# Patient Record
Sex: FEMALE | Race: WHITE | NOT HISPANIC OR LATINO | URBAN - METROPOLITAN AREA
[De-identification: names, ages, dates, MRNs, and addresses within clinical notes are randomized per-mention and may not be internally consistent; named-entity substitution may affect disease eponyms.]

---

## 2023-01-28 ENCOUNTER — APPOINTMENT (EMERGENCY)
Dept: RADIOLOGY | Facility: HOSPITAL | Age: 68
End: 2023-01-28

## 2023-01-28 ENCOUNTER — HOSPITAL ENCOUNTER (EMERGENCY)
Facility: HOSPITAL | Age: 68
Discharge: HOME | End: 2023-01-28
Attending: EMERGENCY MEDICINE

## 2023-01-28 VITALS
RESPIRATION RATE: 18 BRPM | BODY MASS INDEX: 35.11 KG/M2 | WEIGHT: 218.48 LBS | SYSTOLIC BLOOD PRESSURE: 120 MMHG | TEMPERATURE: 97.8 F | DIASTOLIC BLOOD PRESSURE: 55 MMHG | HEIGHT: 66 IN | HEART RATE: 58 BPM | OXYGEN SATURATION: 100 %

## 2023-01-28 DIAGNOSIS — S52.501A CLOSED FRACTURE OF RIGHT DISTAL RADIUS: Primary | ICD-10-CM

## 2023-01-28 RX ORDER — OXYCODONE HYDROCHLORIDE 5 MG/1
5 TABLET ORAL ONCE
Status: COMPLETED | OUTPATIENT
Start: 2023-01-28 | End: 2023-01-28

## 2023-01-28 RX ORDER — OXYCODONE HYDROCHLORIDE 5 MG/1
5 TABLET ORAL EVERY 6 HOURS PRN
Qty: 12 TABLET | Refills: 0 | Status: SHIPPED | OUTPATIENT
Start: 2023-01-28 | End: 2023-02-01

## 2023-01-28 RX ORDER — ATORVASTATIN CALCIUM 40 MG/1
40 TABLET, FILM COATED ORAL DAILY
COMMUNITY

## 2023-01-28 RX ORDER — PROPOFOL 10 MG/ML
100 INJECTION, EMULSION INTRAVENOUS ONCE
Status: COMPLETED | OUTPATIENT
Start: 2023-01-28 | End: 2023-01-28

## 2023-01-28 RX ORDER — ACETAMINOPHEN 325 MG/1
975 TABLET ORAL ONCE
Status: COMPLETED | OUTPATIENT
Start: 2023-01-28 | End: 2023-01-28

## 2023-01-28 RX ORDER — MELATONIN
1000 DAILY
COMMUNITY

## 2023-01-28 RX ORDER — KETOROLAC TROMETHAMINE 30 MG/ML
30 INJECTION, SOLUTION INTRAMUSCULAR; INTRAVENOUS ONCE
Status: COMPLETED | OUTPATIENT
Start: 2023-01-28 | End: 2023-01-28

## 2023-01-28 RX ORDER — DULOXETIN HYDROCHLORIDE 60 MG/1
120 CAPSULE, DELAYED RELEASE ORAL DAILY
COMMUNITY

## 2023-01-28 RX ADMIN — PROPOFOL 100 MG: 10 INJECTION, EMULSION INTRAVENOUS at 20:07

## 2023-01-28 RX ADMIN — OXYCODONE HYDROCHLORIDE 5 MG: 5 TABLET ORAL at 18:31

## 2023-01-28 RX ADMIN — KETOROLAC TROMETHAMINE 30 MG: 30 INJECTION, SOLUTION INTRAMUSCULAR; INTRAVENOUS at 18:33

## 2023-01-28 RX ADMIN — SODIUM CHLORIDE 1000 ML: 0.9 INJECTION, SOLUTION INTRAVENOUS at 19:32

## 2023-01-28 RX ADMIN — ACETAMINOPHEN 975 MG: 325 TABLET ORAL at 18:31

## 2023-01-28 NOTE — ED NOTES
Unable to obtain bp x3 due to the way patient is holding wrist and unable to tolerate change in positions  Will get bp in the back        Mira Linares RN  01/28/23 9036

## 2023-01-28 NOTE — ED PROVIDER NOTES
Final Diagnosis:  1  Closed fracture of right distal radius        Chief Complaint   Patient presents with   • Wrist Injury     Pt reports she just [place] the bowling [place] and has deformity to right wrist  Denies head strike, denies thinners  Gcs 15  Only complaint is right wrist       HPI  Patient presents for evaluation of right wrist pain  Sounds like the patient was at a bowling alley and then fell backwards, reaching back with her right hand to break her fall  Immediate onset of pain  She cannot tell me where the pain is worse which is generalized around her distal right forearm  It is worse with movement and better at rest   She has taken nothing for it  Denies any pain in the elbow  No discomfort in hand  Unless otherwise specified:  - No language barrier    - History obtained from patient  - There are no limitations to the history obtained  - Previous charting was reviewed    PMH:   has no past medical history on file  PSH:   has no past surgical history on file  ROS:  Review of Systems   -   - 13 point ROS was performed and all are normal unless stated in the history above  - Nursing note reviewed  Vitals reviewed  - Orders placed by myself and/or advanced practitioner / resident  PE:   Vitals:    01/28/23 2019 01/28/23 2021 01/28/23 2024 01/28/23 2030   BP: 116/56  118/57 118/57   BP Location: Right arm  Left arm Left arm   Pulse: 57 56 (!) 54 55   Resp:   18 18   Temp:       TempSrc: Oral      SpO2: 100% 100% 100% 99%   Weight:       Height:         Vitals reviewed by me  Patient appears uncomfortable sitting in bed  Right wrist is wrapped and ice  I removed the current dressing  There is an obvious deformity  At least mild angulation  High concern for distal radius fracture, possibly ulna  Good capillary refill  Patient is able to move her digits  No pain at the elbow      Unless otherwise specified above:    General: VS reviewed  Appears in NAD    Head: Normocephalic, atraumatic  Eyes: EOM-I  No exudate  ENT: Atraumatic external nose and ears  No malocclusion  No stridor  No drooling  Neck: No JVD  CV: No pallor noted  Lungs:   No tachypnea  No respiratory distress    Abdomen:  Soft, non-tender, non-distended    MSK:   No obvious deformity    Skin: Dry, intact  No obvious rash  Neuro: Awake, alert, GCS15, CN II-XII grossly intact  Speaking in full sentences  Motor grossly intact  Psychiatric/Behavioral: Appropriate mood and affect   Exam: deferred    Physical Exam     Pre-Procedural Sedation  Performed by: Cain Pulido MD  Authorized by: Cain Pulido MD     Consent:     Consent obtained:  Written    Consent given by:  Patient    Risks discussed:   Allergic reaction, dysrhythmia, inadequate sedation, nausea, respiratory compromise necessitating ventilatory assistance and intubation and prolonged sedation necessitating reversal  Universal protocol:     Patient identity confirmation method:  Arm band and verbally with patient  Indications:     Sedation purpose:  Fracture reduction    Procedure necessitating sedation performed by:  Physician performing sedation    Intended level of sedation:  Moderate (conscious sedation)  Pre-sedation assessment:     ASA classification: class 2 - patient with mild systemic disease      Mouth openin finger widths    Thyromental distance:  3 finger widths    Mallampati score:  II - soft palate, uvula, fauces visible    Pre-sedation assessments completed and reviewed: airway patency, cardiovascular function, hydration status, mental status, nausea/vomiting, pain level, respiratory function and temperature      History of difficult intubation: no    Procedural Sedation    Date/Time: 2023 8:21 PM  Performed by: Cain Pulido MD  Authorized by: Cain Pulido MD     Immediate pre-procedure details:     Reviewed: vital signs and relevant labs/tests      Verified: bag valve mask available, emergency equipment available, intubation equipment available, IV patency confirmed, oxygen available and suction available    Procedure details (see MAR for exact dosages):     Preoxygenation:  Nasal cannula    Sedation:  Propofol    Intra-procedure monitoring:  Blood pressure monitoring, cardiac monitor, continuous capnometry, continuous pulse oximetry, frequent LOC assessments and frequent vital sign checks    Intra-procedure events: none      Total sedation time (minutes):  10  Post-procedure details:     Post-sedation assessment completed:  1/28/2023 8:22 PM    Attendance: Constant attendance by certified staff until patient recovered      Recovery: Patient returned to pre-procedure baseline      Post-sedation assessments completed and reviewed: airway patency, cardiovascular function, hydration status, mental status, nausea/vomiting, pain level, respiratory function and temperature      Patient tolerance: Tolerated well, no immediate complications  Orthopedic injury treatment    Date/Time: 1/28/2023 8:22 PM  Performed by: Venkat Maravilla MD  Authorized by: Venkat Maravilla MD     Patient Location:  ED  [place] Protocol:  Consent: Written consent obtained  Consent given by: patient  Patient identity confirmed: verbally with patient      Injury location:  Forearm  Location details:  Right forearm  Injury type:  Fracture  Fracture type: distal radius    Fracture type: distal radius    Neurovascular status: Neurovascularly intact    Distal perfusion: normal    Neurological function: normal    Range of motion: reduced    Local anesthesia used?: No    General anesthesia used?: No    Sedation type:   Moderate (conscious) sedation (See separate Procedural Sedation form)  Manipulation performed?: Yes    Skeletal traction used?: Yes    Reduction successful?: Yes    Confirmation: Reduction confirmed by x-ray    Immobilization:  Splint, sling and ace wrap  Splint type:  Volar short arm  Supplies used:  Cotton padding, elastic bandage and Ortho-Glass  Neurovascular status: Neurovascularly intact    Distal perfusion: normal    Neurological function: normal    Range of motion: unchanged    Patient tolerance:  Patient tolerated the procedure well with no immediate complications       A:  - Nursing note reviewed  ED Course as of 01/28/23 2037   Sat Jan 28, 2023   1907 XR wrist 3+ views RIGHT  Distal radius fracture   2019 Post reduction films show improved alignment  XR wrist 3+ views RIGHT   ED Interpretation   Improved angulation and alignment      XR wrist 3+ views RIGHT   ED Interpretation   Distal radius fracture        Orders Placed This Encounter   Procedures   • Pre-Procedural Sedation   • Procedural Sedation   • Orthopedic injury treatment   • XR wrist 3+ views RIGHT   • XR wrist 3+ views RIGHT   • Ambulatory Referral to Orthopedic Surgery   • Insert peripheral IV   • Apply sling     Labs Reviewed - No data to display      Final Diagnosis:  1  Closed fracture of right distal radius        P:  -Patient presents for evaluation of pain after fall  Will provide x-rays, analgesia, anticipate splinting, possible reduction  -X-ray consistent with distal radius fracture  Patient was then consented and reduction was performed  Significant improvement of angulation  Discussed return precautions, provide pain control and analgesia for use at home        Medications   acetaminophen (TYLENOL) tablet 975 mg (975 mg Oral Given 1/28/23 1831)   ketorolac (TORADOL) injection 30 mg (30 mg Intramuscular Given 1/28/23 1833)   oxyCODONE (ROXICODONE) IR tablet 5 mg (5 mg Oral Given 1/28/23 1831)   sodium chloride 0 9 % bolus 1,000 mL (1,000 mL Intravenous New Bag 1/28/23 1932)   propofol (DIPRIVAN) 200 MG/20ML bolus injection 100 mg (100 mg Intravenous Given 1/28/23 2007)     Time reflects when diagnosis was documented in both MDM as applicable and the Disposition within this note     Time User Action Codes Description Comment    1/28/2023  8:24 PM Leonor Fuchs Add [S52 501A] Closed fracture of right distal radius       ED Disposition     ED Disposition   Discharge    Condition   Stable    Date/Time   Sat Jan 28, 2023  8:23 PM    Comment   Tiny Chimes discharge to home/self care  Follow-up Information     Follow up With Specialties Details Why Contact Info Additional [phone] [facility] Orthopedic Surgery Schedule an appointment as soon as possible for a visit   [address] [facility]  [address] [facility] [id number]  [address] [place] [address], [address] [place], [place], [id number] [phone]        Patient's Medications   Discharge Prescriptions    OXYCODONE (ROXICODONE) 5 IMMEDIATE RELEASE TABLET    Take 1 tablet (5 mg total) by mouth every 6 (six) hours as needed for severe pain for up to 3 days Max Daily Amount: 20 mg       Start Date: 1/28/2023 End Date: 1/31/2023       Order Dose: 5 mg       Quantity: 12 tablet    Refills: 0       Prior to Admission Medications   Prescriptions Last Dose Informant Patient Reported? Taking? DULoxetine (CYMBALTA) 60 mg delayed release capsule   Yes Yes   Sig: Take 120 mg by mouth daily   atorvastatin (LIPITOR) 40 mg tablet   Yes Yes   Sig: Take 40 mg by mouth daily   cholecalciferol (VITAMIN D3) 1,000 units tablet   Yes Yes   Sig: Take 1,000 Units by mouth daily      Facility-Administered Medications: None       Portions of the record may have been created with voice recognition software  Occasional wrong word or "sound a like" substitutions may have occurred due to the inherent limitations of voice recognition software  Read the chart carefully and recognize, using context, where substitutions have occurred      Electronically signed by:  MD John Paul Guillen MD  01/28/23 2037

## 2023-01-29 NOTE — ED NOTES
Ambu bag, suctioning, capnography and all other items prepared and readily available for conscious sedation        Maria L Jalloh RN  01/28/23 1958

## 2023-01-30 ENCOUNTER — TELEPHONE (OUTPATIENT)
Dept: OBGYN CLINIC | Facility: HOSPITAL | Age: 68
End: 2023-01-30

## 2023-01-30 NOTE — TELEPHONE ENCOUNTER
Called and spoke w/pt and she was able to loosen and area of ace wrap near thumb that was too tight  She is elevating arm and following instructions and feels better  Will be seen at appt tomorrow

## 2023-01-30 NOTE — TELEPHONE ENCOUNTER
Pt called and states had fall [place] [employer] [place] 1[place]23 and went to ED  Dx Closed R Fx of distal radius  Is currently calling due to swelling  Has been keeping elevated and icing  Pt states ace wrap is on and can get two finges at top and bottom of ace  Nailbeds pink  Pain level 6  Pt has not taken anything for pain  She took on oxycodone at noon  Took tylenol at night  Tylenol per label more recommended dose; Advil/Mortrin/Alever per label instructions  Advised pt to continue ace secure but not too tight, elevate, ice and take pain med as prescribed  Please advise any further recommendations  Appt is tomorrow

## 2023-01-31 ENCOUNTER — OFFICE VISIT (OUTPATIENT)
Dept: OBGYN CLINIC | Facility: HOSPITAL | Age: 68
End: 2023-01-31

## 2023-01-31 VITALS
SYSTOLIC BLOOD PRESSURE: 135 MMHG | HEART RATE: 70 BPM | WEIGHT: 218.48 LBS | BODY MASS INDEX: 35.11 KG/M2 | HEIGHT: 66 IN | DIASTOLIC BLOOD PRESSURE: 89 MMHG

## 2023-01-31 DIAGNOSIS — S52.501A CLOSED FRACTURE OF RIGHT DISTAL RADIUS: ICD-10-CM

## 2023-01-31 RX ORDER — LOSARTAN POTASSIUM 100 MG/1
100 TABLET ORAL EVERY MORNING
COMMUNITY
Start: 2022-11-14

## 2023-01-31 RX ORDER — CHLORHEXIDINE GLUCONATE 0.12 MG/ML
15 RINSE ORAL ONCE
Status: CANCELLED | OUTPATIENT
Start: 2023-01-31 | End: 2023-01-31

## 2023-01-31 RX ORDER — ATORVASTATIN CALCIUM 40 MG/1
40 TABLET, FILM COATED ORAL DAILY
COMMUNITY
Start: 2022-12-28

## 2023-01-31 RX ORDER — CEFAZOLIN SODIUM 2 G/50ML
2000 SOLUTION INTRAVENOUS ONCE
Status: CANCELLED | OUTPATIENT
Start: 2023-01-31 | End: 2023-01-31

## 2023-01-31 RX ORDER — SODIUM CHLORIDE, SODIUM LACTATE, POTASSIUM CHLORIDE, CALCIUM CHLORIDE 600; 310; 30; 20 MG/100ML; MG/100ML; MG/100ML; MG/100ML
125 INJECTION, SOLUTION INTRAVENOUS CONTINUOUS
Status: CANCELLED | OUTPATIENT
Start: 2023-01-31

## 2023-01-31 NOTE — PROGRESS NOTES
H&P Exam - Orthopedics   Jared Cho 79 y o  female MRN: [id number]  Unit/Bed#:  Encounter: [id number]    Assessment/Plan     Assessment:  Right distal radius fracture  Plan:  • Because the patient's fracture was significantly dorsally displaced prior to reduction, we believe that she would benefit from operative fixation of the right wrist fracture  • The process for surgery was discussed with the patient  We discussed that an incision will be made over the volar aspect of her wrist, necessary structures will be moved aside, the fracture will be reduced, and a plate will be placed on the radius and secured with screws  The surgery typically takes less than 1 hour  After this, she will be immobilized for approximately 2 weeks in a brace, and then we will allow her to work on range of motion  Risks for this and any surgery include bleeding, blood clot, and infection, but that all of these are very unlikely to occur with this surgery  Complications specific to this surgery include hardware failure and the need for repeat surgery  Benefits include an eventual full return to function  • All questions were answered to the patient's satisfaction  Surgical consent was signed at today's appointment and the surgery was scheduled  History of Present Illness   HPI:  Jared Cho is a 79 y o  female who presents to the office today for evaluation of her right distal radius fracture  She was bowling 3 days ago on 1/28 when she "twirled" and fell onto her rear end and put her hand back to catch herself  She had immediate wrist pain and presented to the ER where the fracture was reduction and splinted  She is here today to follow up  She denies numbness or tingling in the hand and fingers  She does not work, but 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  Review of Systems   Constitutional: Negative for chills and fever  HENT: Negative for ear pain and sore throat  Eyes: Negative for pain and visual disturbance  Respiratory: Negative for cough and shortness of breath  Cardiovascular: Negative for chest pain and palpitations  Gastrointestinal: Negative for abdominal pain and vomiting  Musculoskeletal: Negative for arthralgias and back pain  Skin: Negative for color change and rash  Neurological: Negative for seizures and syncope  All other systems reviewed and are negative  Historical Information   History reviewed  No pertinent past medical history  History reviewed  No pertinent surgical history  Social History   Social History     Substance and Sexual Activity   Alcohol Use Not Currently     Social History     Substance and Sexual Activity   Drug Use Not Currently     Social History     Tobacco Use   Smoking Status Never   Smokeless Tobacco Never     Family History: History reviewed  No pertinent family history  Meds/Allergies     Current Outpatient Medications:   •  atorvastatin (LIPITOR) 40 mg tablet, Take 40 mg by mouth daily, Disp: , Rfl:   •  atorvastatin (LIPITOR) 40 mg tablet, Take 40 mg by mouth daily, Disp: , Rfl:   •  cholecalciferol (VITAMIN D3) 1,000 units tablet, Take 1,000 Units by mouth daily, Disp: , Rfl:   •  DULoxetine (CYMBALTA) 60 mg delayed release capsule, Take 120 mg by mouth daily, Disp: , Rfl:   •  losartan (COZAAR) 100 MG tablet, Take 100 mg by mouth every morning, Disp: , Rfl:   •  oxyCODONE (Roxicodone) 5 immediate release tablet, Take 1 tablet (5 mg total) by mouth every 6 (six) hours as needed for severe pain for up to 3 days Max Daily Amount: 20 mg, Disp: 12 tablet, Rfl: 0    No Known Allergies    Objective   Vitals: Blood pressure 135/89, pulse 70, height 5' 6" (1 676 m), weight 99 1 kg (218 lb 7 6 oz)  ,Body mass index is 35 26 kg/m²  Invasive Devices     None                 Physical Exam  Vitals and nursing note reviewed  Constitutional:       General: She is not in acute distress       Appearance: She is well-developed  HENT:      Head: Normocephalic and atraumatic  Eyes:      Conjunctiva/sclera: Conjunctivae normal    Cardiovascular:      Heart sounds: No murmur heard  Comments: No discernible arrhthymias  Pulmonary:      Effort: Pulmonary effort is normal  No respiratory distress  Breath sounds: Normal breath sounds  Abdominal:      Palpations: Abdomen is soft  Tenderness: There is no abdominal tenderness  Musculoskeletal:         General: No swelling  Cervical back: Neck supple  Skin:     General: Skin is warm and dry  Capillary Refill: Capillary refill takes less than 2 seconds  Neurological:      Mental Status: She is alert  Psychiatric:         Mood and Affect: Mood normal        Ortho Exam     Right Wrist:     Inspection: The fingers are uniformly edematous without ecchymosis or visible wounds      Range of Motion:  She is able to move all of her fingers without difficulty      Sensation:  She has good sensation over all fingers      Other:  Fingers WWP  Lab Results: I have personally reviewed pertinent lab results  Imaging: I have personally reviewed pertinent films in PACS  Right wrist X-rays reveal a distal radius fracture which was dorsally displaced prior to reduction  Alignment is improved on follow-up imaging after reduction  EKG, Pathology, and Other Studies: I have personally reviewed pertinent reports  Code Status: Level 1, Full Code  Advance Directive and Living Will:  Not on file  Power of :  Not on file  POLST:  Not on file    Counseling / Coordination of Care  Total floor / unit time spent today 20 minutes  Greater than 50% of total time was spent with the patient and / or family counseling and / or coordination of care  A description of the counseling / coordination of care: discussing, signing consent for, and scheduling upcoming surgery          Scribe Attestation    I,:  Carlos Oglesby PA-C am acting as a scribe while in the presence of the attending physician :       I,:  Yael Duneas MD personally performed the services described in this documentation    as scribed in my presence :

## 2023-02-01 ENCOUNTER — ANESTHESIA (OUTPATIENT)
Dept: PERIOP | Facility: HOSPITAL | Age: 68
End: 2023-02-01

## 2023-02-01 ENCOUNTER — HOSPITAL ENCOUNTER (OUTPATIENT)
Dept: RADIOLOGY | Facility: HOSPITAL | Age: 68
Setting detail: OUTPATIENT SURGERY
Discharge: HOME | End: 2023-02-01

## 2023-02-01 ENCOUNTER — HOSPITAL ENCOUNTER (OUTPATIENT)
Facility: HOSPITAL | Age: 68
Setting detail: OUTPATIENT SURGERY
Discharge: HOME | End: 2023-02-01
Attending: ORTHOPAEDIC SURGERY | Admitting: ORTHOPAEDIC SURGERY

## 2023-02-01 ENCOUNTER — ANESTHESIA EVENT (OUTPATIENT)
Dept: PERIOP | Facility: HOSPITAL | Age: 68
End: 2023-02-01

## 2023-02-01 VITALS
TEMPERATURE: 98.1 F | DIASTOLIC BLOOD PRESSURE: 83 MMHG | OXYGEN SATURATION: 96 % | BODY MASS INDEX: 35.03 KG/M2 | WEIGHT: 218 LBS | HEIGHT: 66 IN | RESPIRATION RATE: 18 BRPM | HEART RATE: 65 BPM | SYSTOLIC BLOOD PRESSURE: 156 MMHG

## 2023-02-01 DIAGNOSIS — S52.501A CLOSED FRACTURE OF RIGHT DISTAL RADIUS: ICD-10-CM

## 2023-02-01 DIAGNOSIS — M84.433A: Primary | ICD-10-CM

## 2023-02-01 DIAGNOSIS — S52.601A CLOSED FRACTURE OF RIGHT DISTAL RADIUS AND ULNA, INITIAL ENCOUNTER: ICD-10-CM

## 2023-02-01 DIAGNOSIS — S52.501A CLOSED FRACTURE OF RIGHT DISTAL RADIUS AND ULNA, INITIAL ENCOUNTER: ICD-10-CM

## 2023-02-01 DEVICE — 2.4MM VA LOCKING SCREW STARDRIVE 18MM: Type: IMPLANTABLE DEVICE | Site: WRIST | Status: FUNCTIONAL

## 2023-02-01 DEVICE — 2.4MM VA-LCP 2-CLMN VLR DSTL RADIUS PL 6H HD/3H SHAFT/RIGHT
Type: IMPLANTABLE DEVICE | Site: WRIST | Status: FUNCTIONAL
Brand: VA-LCP

## 2023-02-01 DEVICE — 2.4MM VA LOCKING SCREW STARDRIVE 16MM: Type: IMPLANTABLE DEVICE | Site: WRIST | Status: FUNCTIONAL

## 2023-02-01 DEVICE — 2.7MM CORTEX SCREW SLF-TPNG WITH T8 STARDRIVE RECESS 10MM: Type: IMPLANTABLE DEVICE | Site: WRIST | Status: FUNCTIONAL

## 2023-02-01 DEVICE — 2.7MM CORTEX SCREW SLF-TPNG WITH T8 STARDRIVE RECESS 12MM: Type: IMPLANTABLE DEVICE | Site: WRIST | Status: FUNCTIONAL

## 2023-02-01 RX ORDER — MAGNESIUM HYDROXIDE 1200 MG/15ML
LIQUID ORAL AS NEEDED
Status: DISCONTINUED | OUTPATIENT
Start: 2023-02-01 | End: 2023-02-01 | Stop reason: HOSPADM

## 2023-02-01 RX ORDER — PROPOFOL 10 MG/ML
INJECTION, EMULSION INTRAVENOUS AS NEEDED
Status: DISCONTINUED | OUTPATIENT
Start: 2023-02-01 | End: 2023-02-01

## 2023-02-01 RX ORDER — MIDAZOLAM HYDROCHLORIDE 2 MG/2ML
INJECTION, SOLUTION INTRAMUSCULAR; INTRAVENOUS AS NEEDED
Status: DISCONTINUED | OUTPATIENT
Start: 2023-02-01 | End: 2023-02-01

## 2023-02-01 RX ORDER — CEFAZOLIN SODIUM 2 G/50ML
SOLUTION INTRAVENOUS AS NEEDED
Status: DISCONTINUED | OUTPATIENT
Start: 2023-02-01 | End: 2023-02-01

## 2023-02-01 RX ORDER — BUPIVACAINE HYDROCHLORIDE 5 MG/ML
INJECTION, SOLUTION PERINEURAL
Status: COMPLETED | OUTPATIENT
Start: 2023-02-01 | End: 2023-02-01

## 2023-02-01 RX ORDER — FENTANYL CITRATE/PF 50 MCG/ML
25 SYRINGE (ML) INJECTION
Status: DISCONTINUED | OUTPATIENT
Start: 2023-02-01 | End: 2023-02-01 | Stop reason: HOSPADM

## 2023-02-01 RX ORDER — SODIUM CHLORIDE, SODIUM LACTATE, POTASSIUM CHLORIDE, CALCIUM CHLORIDE 600; 310; 30; 20 MG/100ML; MG/100ML; MG/100ML; MG/100ML
INJECTION, SOLUTION INTRAVENOUS CONTINUOUS PRN
Status: DISCONTINUED | OUTPATIENT
Start: 2023-02-01 | End: 2023-02-01

## 2023-02-01 RX ORDER — FENTANYL CITRATE 50 UG/ML
INJECTION, SOLUTION INTRAMUSCULAR; INTRAVENOUS AS NEEDED
Status: DISCONTINUED | OUTPATIENT
Start: 2023-02-01 | End: 2023-02-01

## 2023-02-01 RX ORDER — ONDANSETRON 2 MG/ML
INJECTION INTRAMUSCULAR; INTRAVENOUS AS NEEDED
Status: DISCONTINUED | OUTPATIENT
Start: 2023-02-01 | End: 2023-02-01

## 2023-02-01 RX ORDER — DEXAMETHASONE SODIUM PHOSPHATE 10 MG/ML
INJECTION, SOLUTION INTRAMUSCULAR; INTRAVENOUS AS NEEDED
Status: DISCONTINUED | OUTPATIENT
Start: 2023-02-01 | End: 2023-02-01

## 2023-02-01 RX ORDER — DIPHENHYDRAMINE HYDROCHLORIDE 50 MG/ML
12.5 INJECTION INTRAMUSCULAR; INTRAVENOUS ONCE AS NEEDED
Status: DISCONTINUED | OUTPATIENT
Start: 2023-02-01 | End: 2023-02-01 | Stop reason: HOSPADM

## 2023-02-01 RX ORDER — SODIUM CHLORIDE, SODIUM LACTATE, POTASSIUM CHLORIDE, CALCIUM CHLORIDE 600; 310; 30; 20 MG/100ML; MG/100ML; MG/100ML; MG/100ML
50 INJECTION, SOLUTION INTRAVENOUS CONTINUOUS
Status: DISCONTINUED | OUTPATIENT
Start: 2023-02-01 | End: 2023-02-01 | Stop reason: HOSPADM

## 2023-02-01 RX ORDER — SODIUM CHLORIDE, SODIUM LACTATE, POTASSIUM CHLORIDE, CALCIUM CHLORIDE 600; 310; 30; 20 MG/100ML; MG/100ML; MG/100ML; MG/100ML
125 INJECTION, SOLUTION INTRAVENOUS CONTINUOUS
Status: DISCONTINUED | OUTPATIENT
Start: 2023-02-01 | End: 2023-02-01 | Stop reason: HOSPADM

## 2023-02-01 RX ORDER — LIDOCAINE HYDROCHLORIDE 10 MG/ML
INJECTION, SOLUTION EPIDURAL; INFILTRATION; INTRACAUDAL; PERINEURAL AS NEEDED
Status: DISCONTINUED | OUTPATIENT
Start: 2023-02-01 | End: 2023-02-01

## 2023-02-01 RX ORDER — ONDANSETRON 2 MG/ML
4 INJECTION INTRAMUSCULAR; INTRAVENOUS ONCE AS NEEDED
Status: DISCONTINUED | OUTPATIENT
Start: 2023-02-01 | End: 2023-02-01 | Stop reason: HOSPADM

## 2023-02-01 RX ORDER — CEFAZOLIN SODIUM 2 G/50ML
2000 SOLUTION INTRAVENOUS ONCE
Status: DISCONTINUED | OUTPATIENT
Start: 2023-02-01 | End: 2023-02-01 | Stop reason: HOSPADM

## 2023-02-01 RX ORDER — OXYCODONE HYDROCHLORIDE 5 MG/1
5 TABLET ORAL EVERY 4 HOURS PRN
Qty: 20 TABLET | Refills: 0 | Status: SHIPPED | OUTPATIENT
Start: 2023-02-01 | End: 2023-02-11

## 2023-02-01 RX ADMIN — DEXAMETHASONE SODIUM PHOSPHATE 10 MG: 10 INJECTION, SOLUTION INTRAMUSCULAR; INTRAVENOUS at 13:09

## 2023-02-01 RX ADMIN — BUPIVACAINE HYDROCHLORIDE 7 ML: 5 INJECTION, SOLUTION PERINEURAL at 12:46

## 2023-02-01 RX ADMIN — ONDANSETRON 4 MG: 2 INJECTION INTRAMUSCULAR; INTRAVENOUS at 13:07

## 2023-02-01 RX ADMIN — LIDOCAINE HYDROCHLORIDE 20 MG: 10 INJECTION, SOLUTION EPIDURAL; INFILTRATION; INTRACAUDAL; PERINEURAL at 13:07

## 2023-02-01 RX ADMIN — SODIUM CHLORIDE, SODIUM LACTATE, POTASSIUM CHLORIDE, AND CALCIUM CHLORIDE 125 ML/HR: .6; .31; .03; .02 INJECTION, SOLUTION INTRAVENOUS at 12:24

## 2023-02-01 RX ADMIN — BUPIVACAINE 20 ML: 13.3 INJECTION, SUSPENSION, LIPOSOMAL INFILTRATION at 12:46

## 2023-02-01 RX ADMIN — CEFAZOLIN SODIUM 2000 MG: 2 SOLUTION INTRAVENOUS at 13:05

## 2023-02-01 RX ADMIN — MIDAZOLAM 2 MG: 1 INJECTION INTRAMUSCULAR; INTRAVENOUS at 12:42

## 2023-02-01 RX ADMIN — PROPOFOL 150 MG: 10 INJECTION, EMULSION INTRAVENOUS at 13:07

## 2023-02-01 RX ADMIN — SODIUM CHLORIDE, SODIUM LACTATE, POTASSIUM CHLORIDE, AND CALCIUM CHLORIDE: .6; .31; .03; .02 INJECTION, SOLUTION INTRAVENOUS at 13:02

## 2023-02-01 RX ADMIN — FENTANYL CITRATE 100 MCG: 50 INJECTION, SOLUTION INTRAMUSCULAR; INTRAVENOUS at 12:42

## 2023-02-01 NOTE — H&P
Orthopedics   Ga Benson 79 y o  female MRN: [id number]  Unit/Bed#: APU 05      Chief Complaint:   right wrist pain    HPI:   79 y o  right hand dominant female status post fall on 1/28 who sustained a right distal radius fracture  She was seen in the office where a surgical discussion was had, and decision was made to proceed with operative intervention  She presents for this today  No change in her interval history  Review Of Systems:   · Skin: Normal  · Neuro: See HPI  · Musculoskeletal: See HPI  · 14 point review of systems negative except as stated above     Past Medical History:   No past medical history on file  Past Surgical History:   No past surgical history on file  Family History:  Family history reviewed and non-contributory  No family history on file      Social History:  Social History     Socioeconomic History   • Marital status: /Civil Union     Spouse name: Not on file   • Number of children: Not on file   • Years of education: Not on file   • Highest education level: Not on file   Occupational History   • Not on file   Tobacco Use   • Smoking status: Never   • Smokeless tobacco: Never   Substance and Sexual Activity   • Alcohol use: Not Currently   • Drug use: Not Currently   • Sexual activity: Not Currently   Other Topics Concern   • Not on file   Social History Narrative   • Not on file     Social Determinants of Health     Financial Resource Strain: Not on file   Food Insecurity: Not on file   Transportation Needs: Not on file   Physical Activity: Not on file   Stress: Not on file   Social Connections: Not on file   Intimate Partner Violence: Not on file   Housing Stability: Not on file       Allergies:   No Known Allergies        Labs:  No results found for: HCT, HGB, PT, INR, WBC, ESR, CRP    Meds:    Current Facility-Administered Medications:   •  bupivacaine liposomal (EXPAREL) 1 3 % injection 20 mL, 20 mL, Infiltration, Once, Gabino Calvert MD  •  ceFAZolin (ANCEF) IVPB (premix in dextrose) 2,000 mg 50 mL, 2,000 mg, Intravenous, Once, MOHSEN LUNDBERG  •  lactated ringers infusion, 125 mL/hr, Intravenous, Continuous, Chayo Pro PA-C, Last Rate: 125 mL/hr at 02/01/23 1224, 125 mL/hr at 02/01/23 1224    Blood Culture:   No results found for: BLOODCX    Wound Culture:   No results found for: WOUNDCULT    Ins and Outs:  No intake/output data recorded  Physical Exam:   /71   Pulse 68   Temp 98 2 °F (36 8 °C) (Temporal)   Resp 18   Ht 5' 6" (1 676 m)   Wt 98 9 kg (218 lb)   SpO2 100%   BMI 35 19 kg/m²   Gen: No acute distress, resting comfortably in bed  HEENT: Eyes clear, moist mucus membranes, hearing intact  Respiratory: No audible wheezing or stridor  Cardiovascular: Well Perfused peripherally, 2+ distal pulse  Abdomen: nondistended, no peritoneal signs  Musculoskeletal: right upper extremity  · Splint in place  · Sensation intact to median, radial, and ulnar distributions  · Motor intact to ain/pin/m/r/u  · Finger tips warm and well perfused    Radiology:   I personally reviewed the films  X-rays AP/Lateral and 2views right wrist shows distal radius fracture     _*_*_*_*_*_*_*_*_*_*_*_*_*_*_*_*_*_*_*_*_*_*_*_*_*_*_*_*_*_*_*_*_*_*_*_*_*_*_*_*_*      Assessment:  79 y  o female S/P fall with right distal radius fracture  Plan for OR today  Plan:   · Non weight bearing right upper extremity in splint  · To OR for open reduction internal fixation of right distal radius fracture  · Body mass index is 35 19 kg/m²  moderately obese  Recommend behavior modifications, nutrition and physical activity    · Dispo: Ortho will follow    Esteban Jolley MD

## 2023-02-01 NOTE — ANESTHESIA PREPROCEDURE EVALUATION
Procedure:  OPEN REDUCTION W/ INTERNAL FIXATION (ORIF) RADIUS / ULNA (WRIST) (Right: Wrist)    Relevant Problems   No relevant active problems      Obesity (BMI 35)         Anesthesia Plan  ASA Score- 2     Anesthesia Type- general with ASA Monitors  Additional Monitors:   Airway Plan:     Comment: Discussed long acting interscalene nerve block for postoperative pain control with risks/benefits/alternatives  Patient made aware of possible postoperative shortness of breath related to transient hemidiaphragmatic paralysis  Discussed extremely low likelihood of transient or permanent nerve injury in the setting of ultrasound guidance and patient participation  Patient aware and would like to proceed          Plan Factors-Exercise tolerance (METS): >4 METS  Chart reviewed  Existing labs reviewed  Induction- intravenous  Postoperative Plan- Plan for postoperative opioid use  Planned trial extubation    Informed Consent- Anesthetic plan and risks discussed with patient  I personally reviewed this patient with the CRNA  Discussed and agreed on the Anesthesia Plan with the CRNA  Alexander Miner

## 2023-02-01 NOTE — ANESTHESIA PROCEDURE NOTES
Peripheral Block    Patient location during procedure: holding area  Start time: 2/1/2023 12:46 PM  Reason for block: at surgeon's request and post-op pain management  Staffing  Performed: Anesthesiologist   Anesthesiologist: Fabian Diaz MD  Preanesthetic Checklist  Completed: patient identified, IV checked, site marked, risks and benefits discussed, surgical consent, monitors and equipment checked, pre-op evaluation and timeout performed  Peripheral Block  Prep: ChloraPrep  Patient monitoring: continuous pulse ox and frequent blood pressure checks  Block type: interscalene  Laterality: right  Injection technique: single-shot  Procedures: ultrasound guided, Ultrasound guidance required for the procedure to increase accuracy and safety of medication placement and decrease risk of complications    Ultrasound permanent image savedbupivacaine (MARCAINE) 0 5 % - Perineural   7 mL - 2/1/2023 12:46:00 PM  Needle  Needle type: Stimuplex   Needle gauge: 20 G  Needle length: 4 in  Needle localization: ultrasound guidance  Test dose: negative  Assessment  Injection assessment: incremental injection, local visualized surrounding nerve on ultrasound, no paresthesia on injection and negative aspiration for heme  Paresthesia pain: none  Heart rate change: no  Slow fractionated injection: yes  Post-procedure:  site cleaned  patient tolerated the procedure well with no immediate complications  Additional Notes  With Exparel 20 mL

## 2023-02-01 NOTE — OP NOTE
OPERATIVE REPORT  PATIENT NAME: Eliot Leyva    :  1955  MRN: [id number]  Pt Location: [facility] OR ROOM 04    SURGERY DATE: 2023    Surgeon(s) and Role:     * Ly Chávez MD - Primary     * Ruma Levine MD - Assisting     * Amparo Lehman PA-C    Preop Diagnosis:  Closed fracture of right distal radius [S52 501A]    Post-Op Diagnosis Codes:     * Closed fracture of right distal radius [S52 501A]    Procedure(s):  Right - OPEN REDUCTION W/ INTERNAL FIXATION (ORIF) RADIUS / ULNA (WRIST)    Specimen(s):  * No specimens in log *    Estimated Blood Loss:   Minimal    Drains:  * No LDAs found *    Anesthesia Type:   General w/ Regional    Operative Indications:  Closed fracture of right distal radius [S52 501A]    Operative Findings:  Reduction and fixation of extra-articular distal radius fracture with volar locking plate    Complications:   None    Procedure and Technique: In brief, patient is a 30-year-old female status post fall with displaced right distal radius fracture  Given patient's age, initial displacement, and dorsal comminution, patient was at risk for loss of reduction and distal radius malunion  Shared decision making conversation was had with the patient, patient elected to undergo open reduction internal fixation of right distal radius fracture  Risks and benefits were discussed with the patient including but not limited to: Blood loss, infection, blood clot, damage to surrounding structures, loss of function, pain, hardware failure/irritation, tendon injury, need for further surgery  Informed consent was obtained  Patient brought to the operating room placed supine on the OR with a hand table extension  Tourniquet was applied  Patient was prepped and draped in the usual sterile fashion  A timeout was performed confirming correct patient, location, and surgical procedure  The arm was elevated and the tourniquet was inflated to 250mmHg   A trans-FCR approach to the distal radius was utilized to expose the distal radius, protecting the median nerve, plamar cutaneous branch of the median nerve, radial artery, and superficial sensory branch of the radial nerve  The fracture was exposed, fracture hematoma and soft tissue was debrided from the fracture  Fracture was manually reduced and pinned in place with a radial styloid pin  Reduction and pin placement were confirmed with fluoro imaging and visual inspection  A 3-hole volar locking plate was then pinned in place over the distal radius  Once fluoroscopy confirmed appropriate position of the plate, the plate was secured to the bone with combination of bicortical non-locking screws proximally and unicortical locking screws distally  Final x-rays confirmed appropriate reduction of fracture, no intra-articular screw penetration into the radiocarpal joint or DRUJ  Pins were removed  There were no blocks to motion  Patient was irrigated and closed in a layered fashion  Sterile dressing was applied  Patient was brought to PACU in stable condition  Postoperatively, patient will be nonweightbearing to the right upper extremity  Recommend mechanical DVT prophylaxis with frequent ambulation  Patient will follow-up in the office in 2 weeks for suture removal and x-rays of the right wrist      Dr Will Wilson was present for the entire procedure       Patient Disposition:  PACU         SIGNATURE: Yolanda Quintero MD  DATE: February 1, 2023  TIME: 2:06 PM

## 2023-02-01 NOTE — DISCHARGE INSTR - AVS FIRST PAGE
Discharge Instructions - Orthopedics  Chaparro Dial 79 y o  female MRN: [id number]  Unit/Bed#: Operating Room    Weight Bearing Status:                                           Non weight bearing right upper extremity    Pain:  Continue analgesics as directed    Dressing Instructions: On post-op day 3 (2/4) remove dressings currently on the wrist   If there is a yellow strip of xeroform, this can stay  Then recover the wound in 2-3 gauze pads and re-wrap in a fresh ace bandage  Then apply the Velcro wrist splint over these dressings and leave until follow-up  Appt Instructions: If you do not have your appointment, please call the clinic at [phone]  Otherwise followup as scheduled     Contact the office sooner if you experience any increased numbness/tingling in the extremities        Miscellaneous:  None

## 2023-02-01 NOTE — ANESTHESIA POSTPROCEDURE EVALUATION
Post-Op Assessment Note    CV Status:  Stable    Pain management: adequate     Mental Status:  Alert and awake   Hydration Status:  Stable   PONV Controlled:  None   Airway Patency:  Adequate      Post Op Vitals Reviewed: Yes      Staff: Anesthesiologist, CRNA         No notable events documented      BP   157/69   Temp 97   Pulse 60   Resp 14   SpO2 99% on RA

## 2023-02-02 DIAGNOSIS — S52.501A CLOSED FRACTURE OF DISTAL END OF RIGHT RADIUS, UNSPECIFIED FRACTURE MORPHOLOGY, INITIAL ENCOUNTER: Primary | ICD-10-CM

## 2023-02-03 ENCOUNTER — TELEPHONE (OUTPATIENT)
Dept: OBGYN CLINIC | Facility: HOSPITAL | Age: 68
End: 2023-02-03

## 2023-02-03 ENCOUNTER — TELEPHONE (OUTPATIENT)
Dept: OBGYN CLINIC | Facility: MEDICAL CENTER | Age: 68
End: 2023-02-03

## 2023-02-03 NOTE — TELEPHONE ENCOUNTER
Spoke to patient and she wanted to let Dr Marek Madden know that she is still having numbness and tingling to her hand  Mild swelling to fingers  Good cap refill  Advised that patient had a long acting nerve block that can last 5 days  She started yesterday with the numbness and tingling and is now able to move her shoulder  Advised it sounds like the block is starting to wear off and she should keep in front of her pain with the tylenol and oxycodone 5mg as the hand starts to wake up  She verbalized understanding but remains worried  I will check in with her in a few hours to see how she is doing  Please advise

## 2023-02-03 NOTE — TELEPHONE ENCOUNTER
Caller:  Ta Ruvalcaba    Doctor: Smita Greenwood    Reason for call: Patient wanted to speak to a nurse regarding her surgery, transferred call to [facility]    Call back#: [phone]

## 2023-02-03 NOTE — TELEPHONE ENCOUNTER
Caller: Patient    Doctor: Ish Garza    Reason for call:     Patient is calling back to speak with the nurse  She is asking if she needs to change the dressing, if the   Nerve block has not changed, she should she try to change the bandage?    (Closed fracture of right distal radius)    Call back#: [phone]

## 2023-02-03 NOTE — TELEPHONE ENCOUNTER
Spoke with patient and the pain block is still on board  No complaint of pain  She will continue to monitor for increased swelling, Cap refill greater than 3 seconds or uncontrolled pain  She will ice 30 on 30 off, elevate above the heart, try to flex and extend fingers several times per hour, loosen outer ace wrap if the dressing feels too tight  Verbalized understanding

## 2023-02-03 NOTE — TELEPHONE ENCOUNTER
Spoke with patient and advised that yes she should follows 's plan of care for dressing removal tomorrow even if pain block is still on board  Patient verbalized understanding

## 2023-02-07 NOTE — TELEPHONE ENCOUNTER
Caller: Patient     Doctor: Dr Stephanie Cabral    Reason for call: Patient calling in wanting to know how often she should be changing the dressing and if you are aloud to get it wet once the dressing is removed  Patient asking to speak to triage nurse  Patient can be reached at number below      Call back#: [phone]

## 2023-02-13 ENCOUNTER — OFFICE VISIT (OUTPATIENT)
Dept: OBGYN CLINIC | Facility: HOSPITAL | Age: 68
End: 2023-02-13

## 2023-02-13 ENCOUNTER — HOSPITAL ENCOUNTER (OUTPATIENT)
Dept: RADIOLOGY | Facility: HOSPITAL | Age: 68
Discharge: HOME | End: 2023-02-13
Attending: ORTHOPAEDIC SURGERY

## 2023-02-13 VITALS
SYSTOLIC BLOOD PRESSURE: 136 MMHG | DIASTOLIC BLOOD PRESSURE: 82 MMHG | HEART RATE: 61 BPM | WEIGHT: 218 LBS | HEIGHT: 66 IN | BODY MASS INDEX: 35.03 KG/M2

## 2023-02-13 DIAGNOSIS — S52.501A CLOSED FRACTURE OF DISTAL END OF RIGHT RADIUS, UNSPECIFIED FRACTURE MORPHOLOGY, INITIAL ENCOUNTER: Primary | ICD-10-CM

## 2023-02-13 DIAGNOSIS — S52.501A CLOSED FRACTURE OF DISTAL END OF RIGHT RADIUS, UNSPECIFIED FRACTURE MORPHOLOGY, INITIAL ENCOUNTER: ICD-10-CM

## 2023-02-13 DIAGNOSIS — M81.0 AGE-RELATED OSTEOPOROSIS WITHOUT CURRENT PATHOLOGICAL FRACTURE: ICD-10-CM

## 2023-02-13 NOTE — PROGRESS NOTES
Assessment:   Diagnosis ICD-10-CM Associated Orders   1  Closed fracture of distal end of right radius, unspecified fracture morphology, initial encounter  S52 501A DXA bone density spine hip and pelvis     Ambulatory Referral to PT/OT Hand Therapy      2  Age-related osteoporosis without current pathological fracture  M81 0 DXA bone density spine hip and pelvis          Plan:  · A discussion was had with the patient that her imaging looks very good at today's visit  · She should begin to work on AROM and PROM of the right wrist and hand at this time  A PT/OT referral was made for the patient to help her begin to regain function of the right wrist and hand  · She already has a Velcro wist brace  I recommended she wear it most times of the day and night in the first couple of weeks after her injury, but she should remove it at least 3 times a day to work on AROM and PROM of the wrist, hand, and fingers  She may take it off when relaxing at home, as well  · Her sutures were removed and Steri-Strips were applied  She may shower at this time and pat the wounds dry when she is done  The Steri-Strips will fall off on their own in the shower in 7-10 days  She should avoid soaking her wounds in a bath or pool  · The physician was consulted and was instrumental in the formulation of the plan  To do next visit:  Follow-up in 4 weeks for further imaging and to assess post-op pain and function  The above stated was discussed in layman's terms and the patient expressed understanding  All questions were answered to the patient's satisfaction  Subjective: Shannon Velasco is a 79 y o  female who presents to the office today for a first post-op appointment from her ORIF of her right distal radius on 2/1/23  She reports that she had numbness and tingling for 5 days after her block which made her very nervous  However, she is better now and currently denies numbness or tingling    She takes Tylenol for pain and tries to avoid strong pain killers as much as possible  Review of systems negative unless otherwise specified in HPI    History reviewed  No pertinent past medical history  Past Surgical History:   Procedure Laterality Date   • MT OPTX DSTL RADL X-ARTIC FX/EPIPHYSL SEP Right 2/1/2023    Procedure: OPEN REDUCTION W/ INTERNAL FIXATION (ORIF) RADIUS / ULNA (WRIST); Surgeon: Marek Hobbs MD;  Location: [facility];  Service: Orthopedics       History reviewed  No pertinent family history  Social History     Occupational History   • Not on file   Tobacco Use   • Smoking status: Never   • Smokeless tobacco: Never   Substance and Sexual Activity   • Alcohol use: Not Currently   • Drug use: Not Currently   • Sexual activity: Not Currently         Current Outpatient Medications:   •  atorvastatin (LIPITOR) 40 mg tablet, Take 40 mg by mouth daily, Disp: , Rfl:   •  atorvastatin (LIPITOR) 40 mg tablet, Take 40 mg by mouth daily, Disp: , Rfl:   •  cholecalciferol (VITAMIN D3) 1,000 units tablet, Take 1,000 Units by mouth daily, Disp: , Rfl:   •  DULoxetine (CYMBALTA) 60 mg delayed release capsule, Take 120 mg by mouth daily, Disp: , Rfl:   •  losartan (COZAAR) 100 MG tablet, Take 100 mg by mouth every morning, Disp: , Rfl:     No Known Allergies         Vitals:    02/13/23 1336   BP: 136/82   Pulse: 61       Objective:    General:  Patient is WDWN, alert and oriented, appears stated age, and is in no acute distress  Musculoskeletal:    Right Wrist:    Inspection:  Incisions are well-approximated and well-healed without erythema or purulent material indicative of infection  Range of Motion:  The patient is able to achieve approximately 45 degrees of wrist flexion and 15 degrees of wrist extension  She can achieve full active pronation and near full active supination  She has good radial and ulnar deviation  Palpation:  She is tender over the incision site      Sensation:  SILT over the fingers  Other:  Fingers WWP  Diagnostics, reviewed and taken today if performed as documented: The attending physician has personally reviewed the pertinent films in PACS and interpretation is as follows:  Right Wrist X-rays:  The patient's fractures are held in stable alignment  There is no evidence of hardware loosening or failure  Procedures, if performed today:    None performed      Portions of the record may have been created with voice recognition software  Occasional wrong word or "sound a like" substitutions may have occurred due to the inherent limitations of voice recognition software  Read the chart carefully and recognize, using context, where substitutions have occurred

## 2023-02-14 ENCOUNTER — TELEPHONE (OUTPATIENT)
Dept: OBGYN CLINIC | Facility: HOSPITAL | Age: 68
End: 2023-02-14

## 2023-02-14 NOTE — TELEPHONE ENCOUNTER
Caller:  Romy Gomez    Doctor: Geraldine Clemens    Reason for call: Patient will call with fax number to send PT script    Call back#: na

## 2023-02-15 ENCOUNTER — TELEPHONE (OUTPATIENT)
Dept: OBGYN CLINIC | Facility: MEDICAL CENTER | Age: 68
End: 2023-02-15

## 2023-02-15 NOTE — TELEPHONE ENCOUNTER
Caller: Patient    Doctor: Latrell Laguerre    Reason for call:     Roma Mishra at [facility] requesting the script for Ambulatory Referral to PT/OT Hand Therapy (Order [id number]) be faxed to her fax number [phone]  She has an appointment today at 11:00 am     Call back#: [phone] if any questions

## 2023-03-14 ENCOUNTER — OFFICE VISIT (OUTPATIENT)
Dept: OBGYN CLINIC | Facility: HOSPITAL | Age: 68
End: 2023-03-14

## 2023-03-14 ENCOUNTER — HOSPITAL ENCOUNTER (OUTPATIENT)
Dept: RADIOLOGY | Facility: HOSPITAL | Age: 68
Discharge: HOME | End: 2023-03-14
Attending: ORTHOPAEDIC SURGERY

## 2023-03-14 VITALS
DIASTOLIC BLOOD PRESSURE: 83 MMHG | WEIGHT: 218 LBS | HEIGHT: 66 IN | BODY MASS INDEX: 35.03 KG/M2 | SYSTOLIC BLOOD PRESSURE: 132 MMHG | HEART RATE: 70 BPM

## 2023-03-14 DIAGNOSIS — S52.501A CLOSED FRACTURE OF DISTAL END OF RIGHT RADIUS, UNSPECIFIED FRACTURE MORPHOLOGY, INITIAL ENCOUNTER: ICD-10-CM

## 2023-03-14 DIAGNOSIS — Z48.89 AFTERCARE FOLLOWING SURGERY: Primary | ICD-10-CM

## 2023-03-14 DIAGNOSIS — Z48.89 AFTERCARE FOLLOWING SURGERY: ICD-10-CM

## 2023-03-14 RX ORDER — DULOXETIN HYDROCHLORIDE 20 MG/1
CAPSULE, DELAYED RELEASE ORAL
COMMUNITY
Start: 2023-03-09

## 2023-03-14 RX ORDER — FLUOXETINE 10 MG/1
TABLET, FILM COATED ORAL
COMMUNITY
Start: 2023-03-09

## 2023-03-14 NOTE — PROGRESS NOTES
Assessment:  1  Aftercare following surgery  XR wrist 2 vw right            Surgery: Open Reduction W/ Internal Fixation (orif) Radius / Ulna (wrist) - Right  Date of Surgery: 2/1/2023        Discussion and Plan:   · Discussed as swelling improves the paresthesias along the median nerve distrubution should continue to improve  · Continue therapy  · Patient may drive when ready    Follow Up:  Return for 6-8 weeks with x-rays  CHIEF COMPLAINT:  Chief Complaint   Patient presents with   • Right Wrist - Post-op         SUBJECTIVE:  Reji Martino is a 79y o  year old female who presents for follow up nearly 6 weeks after Open Reduction W/ Internal Fixation (orif) Radius / Ulna (wrist) - Right  Patient has been attending PT as instructed  Patient reports numbness and tingling in the tips of the thumb, index, and long fingers  Patient reports n/t has improved  Patient has persistent wrist soreness  PHYSICAL EXAMINATION:  General: well developed and well nourished, alert, oriented times 3 and appears comfortable  Psychiatric: Normal    MUSCULOSKELETAL EXAMINATION:  Incision: Clean, dry, intact and healed  Surgery Site: normal, no evidence of infection   Range of Motion: As expected and Limited due to stiffness  Neurovascular status: Neuro intact, good cap refill and decreased sensation tips of thumb, index, and long, negative tinel's, negative Durkan compression  Activity Restrictions: No restrictions       I have personally reviewed pertinent films in PACS and my interpretation is as follows    Right wrist x-rays demonstrates progressive healing of fracture, hardware in acceptable alignment and position     Scribe Attestation    I,:  Kirsten Ojeda am acting as a scribe while in the presence of the attending physician :       I,:  Peter Wilson MD personally performed the services described in this documentation    as scribed in my presence :

## 2023-04-14 DIAGNOSIS — S52.501A CLOSED FRACTURE OF DISTAL END OF RIGHT RADIUS, UNSPECIFIED FRACTURE MORPHOLOGY, INITIAL ENCOUNTER: Primary | ICD-10-CM

## 2023-05-02 ENCOUNTER — HOSPITAL ENCOUNTER (OUTPATIENT)
Dept: RADIOLOGY | Facility: HOSPITAL | Age: 68
Discharge: HOME | End: 2023-05-02
Attending: ORTHOPAEDIC SURGERY

## 2023-05-02 ENCOUNTER — OFFICE VISIT (OUTPATIENT)
Dept: OBGYN CLINIC | Facility: HOSPITAL | Age: 68
End: 2023-05-02

## 2023-05-02 VITALS
DIASTOLIC BLOOD PRESSURE: 83 MMHG | HEART RATE: 68 BPM | BODY MASS INDEX: 35.19 KG/M2 | SYSTOLIC BLOOD PRESSURE: 136 MMHG | HEIGHT: 66 IN

## 2023-05-02 DIAGNOSIS — M80.08XA AGE-RELATED OSTEOPOROSIS WITH CURRENT PATHOLOGICAL FRACTURE, VERTEBRA(E), INITIAL ENCOUNTER FOR FRACTURE (HCC): ICD-10-CM

## 2023-05-02 DIAGNOSIS — S52.501A CLOSED FRACTURE OF DISTAL END OF RIGHT RADIUS, UNSPECIFIED FRACTURE MORPHOLOGY, INITIAL ENCOUNTER: Primary | ICD-10-CM

## 2023-05-02 DIAGNOSIS — G56.01 CARPAL TUNNEL SYNDROME ON RIGHT: ICD-10-CM

## 2023-05-02 DIAGNOSIS — S52.501A CLOSED FRACTURE OF DISTAL END OF RIGHT RADIUS, UNSPECIFIED FRACTURE MORPHOLOGY, INITIAL ENCOUNTER: ICD-10-CM

## 2023-05-02 RX ORDER — DULOXETIN HYDROCHLORIDE 30 MG/1
CAPSULE, DELAYED RELEASE ORAL
COMMUNITY
Start: 2023-03-09

## 2023-05-02 RX ORDER — FLUOXETINE 20 MG/1
TABLET, FILM COATED ORAL
COMMUNITY
Start: 2023-04-20

## 2023-05-02 NOTE — ASSESSMENT & PLAN NOTE
I will refer her to Dr Manjit Ram for evaluation of her carpal tunnel syndrome  She can follow-up with him at her convenience in the near future

## 2023-05-02 NOTE — ASSESSMENT & PLAN NOTE
We will see her on a as needed basis with her wrist   I think she is doing quite well and does not need to follow-up anymore

## 2023-05-02 NOTE — PROGRESS NOTES
"Assessment/Plan:    Closed fracture of right distal radius  We will see her on a as needed basis with her wrist   I think she is doing quite well and does not need to follow-up anymore  Carpal tunnel syndrome on right  I will refer her to Dr Manjit Ram for evaluation of her carpal tunnel syndrome  She can follow-up with him at her convenience in the near future  Diagnoses and all orders for this visit:    Closed fracture of distal end of right radius, unspecified fracture morphology, initial encounter  -     DXA bone density spine hip and pelvis; Future    Age-related osteoporosis with current pathological fracture, vertebra(e), initial encounter for fracture ([facility] [address] )  -     DXA bone density spine hip and pelvis; Future    Carpal tunnel syndrome on right  -     Ambulatory Referral to Orthopedic Surgery; Future    Other orders  -     DULoxetine (CYMBALTA) 30 mg delayed release capsule; TAKE 1 CAPSULE BY MOUTH EVERY EVENING ALONG WITH 20MG DOSE  -     FLUoxetine (PROzac) 20 MG tablet; TAKE 1/2 TABLET BY MOUTH DAILY FOR 1 WEEK  INCREASE TO 1 TABLET BY MOUTH DAILY          Subjective:      Patient ID: Bibi Segovia is a 79 y o  female  This is a 70-year-old woman who is status post open reduction internal fixation of her distal radial fracture on 1 February 2023  She now follows up  She is doing okay except she is having some numbness and tingling in her thumb index and middle fingers  She states that she has had that for a very very long time  She states that the numbness and tingling may be getting worse        The following portions of the patient's history were reviewed and updated as appropriate: allergies, current medications, past family history, past medical history, past social history, past surgical history, and problem list     Review of Systems      Objective:      /83   Pulse 68   Ht 5' 6\" (1 676 m)   BMI 35 19 kg/m²          Physical Exam      On physical examination everything looks " good   She has near full active and passive range of motion  She has some slight numbness and tingling  Skin is intact compartments are soft  There is no sign of other problems

## 2023-05-04 ENCOUNTER — TELEPHONE (OUTPATIENT)
Dept: OBGYN CLINIC | Facility: HOSPITAL | Age: 68
End: 2023-05-04

## 2023-05-04 NOTE — TELEPHONE ENCOUNTER
Caller: Patient    Doctor/Office: Kayleen    Call regarding :  Rescheduling appointment with Zhang Jerome for sooner appointment  Call was transferred to: Hand

## 2023-05-22 ENCOUNTER — OFFICE VISIT (OUTPATIENT)
Dept: OBGYN CLINIC | Facility: CLINIC | Age: 68
End: 2023-05-22
Attending: ORTHOPAEDIC SURGERY

## 2023-05-22 VITALS
SYSTOLIC BLOOD PRESSURE: 144 MMHG | BODY MASS INDEX: 34.23 KG/M2 | HEIGHT: 66 IN | WEIGHT: 213 LBS | DIASTOLIC BLOOD PRESSURE: 86 MMHG

## 2023-05-22 DIAGNOSIS — G56.01 CARPAL TUNNEL SYNDROME ON RIGHT: ICD-10-CM

## 2023-05-22 DIAGNOSIS — R20.2 NUMBNESS AND TINGLING IN RIGHT HAND: Primary | ICD-10-CM

## 2023-05-22 DIAGNOSIS — R20.0 NUMBNESS AND TINGLING IN RIGHT HAND: Primary | ICD-10-CM

## 2023-05-22 RX ORDER — AMOXICILLIN AND CLAVULANATE POTASSIUM 875; 125 MG/1; MG/1
TABLET, FILM COATED ORAL
COMMUNITY
Start: 2023-05-18

## 2023-05-22 RX ORDER — FLUCONAZOLE 100 MG/1
TABLET ORAL
COMMUNITY
Start: 2023-05-11

## 2023-05-22 RX ORDER — CLOTRIMAZOLE 1 %
CREAM (GRAM) TOPICAL
COMMUNITY
Start: 2023-05-11

## 2023-05-22 NOTE — PROGRESS NOTES
ASSESSMENT/PLAN:    Assessment:   Right Carpal Tunnel Syndrome vs Median Neuritis    Plan:   Diagnosis and treatment options discussed with the patient  Patient was counseled that given her history of ORIF of her right distal radius, she is at an increased risk of developing carpal tunnel syndrome requiring surgical intervention  Alternatively she may have median nerve neuritis from her injury or hardware  We will order an US of the wrist to assess for carpal tunnel syndrome  She will follow up after the US for further treatment discussion  Follow Up: After Testing    To Do Next Visit:   Review US results    General Discussions:  Carpal Tunnel Syndrome: The anatomy and physiology of carpal tunnel syndrome was discussed with the patient today  Increase pressure localized under the transverse carpal ligament can cause pain, numbness, tingling, or dysesthesias within the median nerve distribution as well as feelings of fatigue, clumsiness, or awkwardness  These symptoms typically occur at night and worse in the morning upon waking  Eventually, untreated carpal tunnel syndrome can result in weakness and permanent loss of muscle within the thenar compartment of the hand  Treatment options were discussed with the patient  Conservative treatment includes nocturnal resting splints to keep the nerve in a neutral position, ergonomic changes within the work or home environment, activity modification, and tendon gliding exercises  Steroid injections within the carpal canal can help a majority of patients, however this is often self-limited in a majority of patients  Surgical intervention to divide the transverse carpal ligament typically results in a long-lasting relief of the patient's complaints, with the recurrence rate of less than 1%       Operative Discussions:       _____________________________________________________  CHIEF COMPLAINT:  Chief Complaint   Patient presents with   • Right Wrist - Numbness, Pain     Open Reduction W/ Internal Fixation (orif) Radius- by Dr Arlen Lowe 2/1/23         SUBJECTIVE:  Lucila Murry is a 79 y o  female who presents for right hand numbness  Patient has a history of right distal radius fracture ORIF by Dr Arlen Lowe on 2/1/23  Since that time she has been recovering well, but has noted some numbness and tingling in her right thumb index long, and ring fingers  She reports that this is worsened during physical therapy with any prolonged or resisted wrist flexion, better with rest   It does not wake her up from sleep at night  She has been participating with physical therapy for her wrist, but otherwise no treatment for these symptoms  PAST MEDICAL HISTORY:  No past medical history on file  PAST SURGICAL HISTORY:  Past Surgical History:   Procedure Laterality Date   • MD OPTX DSTL RADL X-ARTIC FX/EPIPHYSL SEP Right 2/1/2023    Procedure: OPEN REDUCTION W/ INTERNAL FIXATION (ORIF) RADIUS / ULNA (WRIST); Surgeon: Saint Riggs, MD;  Location: [facility];  Service: Orthopedics       FAMILY HISTORY:  No family history on file  SOCIAL HISTORY:  Social History     Tobacco Use   • Smoking status: Never   • Smokeless tobacco: Never   Substance Use Topics   • Alcohol use: Not Currently   • Drug use: Not Currently       MEDICATIONS:    Current Outpatient Medications:   •  amoxicillin-clavulanate (AUGMENTIN) 875-125 mg per tablet, TAKE 1 TABLET BY MOUTH IN THE MORNING AND 1 TABLET AT BEDTIME FOR 10 DAYS , Disp: , Rfl:   •  atorvastatin (LIPITOR) 40 mg tablet, Take 40 mg by mouth daily, Disp: , Rfl:   •  atorvastatin (LIPITOR) 40 mg tablet, Take 40 mg by mouth daily, Disp: , Rfl:   •  cholecalciferol (VITAMIN D3) 1,000 units tablet, Take 1,000 Units by mouth daily, Disp: , Rfl:   •  clotrimazole (LOTRIMIN) 1 % cream, APPLY TOPICALLY TO THE AFFECTED AREA TWICE DAILY FOR 14 DAYS   APPLY TO VAGINAL AREA, Disp: , Rfl:   •  DULoxetine (CYMBALTA) 20 mg capsule, , Disp: , Rfl:   •  DULoxetine "(CYMBALTA) 30 mg delayed release capsule, TAKE 1 CAPSULE BY MOUTH EVERY EVENING ALONG WITH 20MG DOSE, Disp: , Rfl:   •  DULoxetine (CYMBALTA) 60 mg delayed release capsule, Take 120 mg by mouth daily (Patient not taking: Reported on 5/2/2023), Disp: , Rfl:   •  fluconazole (DIFLUCAN) 100 mg tablet, , Disp: , Rfl:   •  FLUoxetine (PROzac) 10 MG tablet, , Disp: , Rfl:   •  FLUoxetine (PROzac) 20 MG tablet, TAKE 1/2 TABLET BY MOUTH DAILY FOR 1 WEEK  INCREASE TO 1 TABLET BY MOUTH DAILY, Disp: , Rfl:   •  losartan (COZAAR) 100 MG tablet, Take 100 mg by mouth every morning, Disp: , Rfl:     ALLERGIES:  No Known Allergies    REVIEW OF SYSTEMS:  Pertinent items are noted in HPI  A comprehensive review of systems was negative      LABS:  HgA1c: No results found for: HGBA1C  BMP: No results found for: GLUCOSE, CALCIUM, NA, K, CO2, CL, BUN, CREATININE    _____________________________________________________  PHYSICAL EXAMINATION:  Vital signs: /86   Ht 5' 6\" (1 676 m)   Wt 96 6 kg (213 lb)   BMI 34 38 kg/m²   General: well developed and well nourished, alert, oriented times 3 and appears comfortable  Psychiatric: Normal  HEENT: Trachea Midline, No torticollis  Cardiovascular: No discernable arrhythmia  Pulmonary: No wheezing or stridor  Abdomen: No rebound or guarding  Extremities: No peripheral edema  Skin: No masses, erythema, lacerations, fluctation, ulcerations  Neurovascular: Sensation Intact to the Median, Ulnar, Radial Nerve, Motor Intact to the Median, Ulnar, Radial Nerve and Pulses Intact    MUSCULOSKELETAL EXAMINATION:  MSK right upper extremity  • Well healed scar over the volar wrist  • No focal tenderness to palpation  • SILT M/R/U  • Motor 5/5 deltoid, biceps, triceps, wrist flexion, wrist extension, FPL, 5/5 interossei, 5/5 APB  • Carpal Tunnel: No atrophy of thenar compartment, Positive modified Durkhan  and Negative tinel at the wrist  • 2 sec cap refill and 2+ radial " pulse      _____________________________________________________  STUDIES REVIEWED:  Images were reviewed in PACS by Dr Jimena Rivas and demonstrate: AP, lat, and oblique of the R wrist shows healed distal radius fracture      PROCEDURES PERFORMED:  Procedures  No Procedures performed today

## 2023-06-09 ENCOUNTER — VBI (OUTPATIENT)
Dept: ADMINISTRATIVE | Facility: OTHER | Age: 68
End: 2023-06-09

## 2023-06-09 NOTE — TELEPHONE ENCOUNTER
06/09/23 10:08 AM    The patient was called and a message was left with the return number for Central Scheduling [phone]  Thank you    Alexia Bright  [facility]

## 2023-08-08 ENCOUNTER — TELEPHONE (OUTPATIENT)
Age: 68
End: 2023-08-08

## 2023-08-08 NOTE — TELEPHONE ENCOUNTER
Caller: Patient     Doctor: Penelope Dumont    Reason for call:     Patient canceled the [address] [facility] Limited procedure for 7/10/23, it is being scheduled now for   10/2023, it is not showing yet, but she states she scheduled.     Call back#: [phone]
Patient's appointment rescheduled
comfortable appearance/cooperative/no change observed/1:1 in progress

## (undated) DEVICE — SPONGE SCRUB 4 PCT CHLORHEXIDINE

## (undated) DEVICE — 1.25MM KIRSCHNER WIRE W/TROCAR POINT 150MM
Type: IMPLANTABLE DEVICE | Site: WRIST | Status: NON-FUNCTIONAL
Removed: 2023-02-01

## (undated) DEVICE — 1.8MM DRILL BIT WITH DEPTH MARK/QC/110MM

## (undated) DEVICE — DRAPE C-ARM X-RAY

## (undated) DEVICE — GLOVE SRG BIOGEL 9

## (undated) DEVICE — PADDING CAST 4 IN  COTTON STRL

## (undated) DEVICE — PLUMEPEN PRO 10FT

## (undated) DEVICE — U-DRAPE: Brand: CONVERTORS

## (undated) DEVICE — CUFF TOURNIQUET 18 X 4 IN QUICK CONNECT DISP 1 BLADDER

## (undated) DEVICE — SUT ETHILON 2-0 FSLX 30 IN 1674H

## (undated) DEVICE — INTENDED FOR TISSUE SEPARATION, AND OTHER PROCEDURES THAT REQUIRE A SHARP SURGICAL BLADE TO PUNCTURE OR CUT.: Brand: BARD-PARKER SAFETY BLADES SIZE 15, STERILE

## (undated) DEVICE — ACE WRAP 4 IN UNSTERILE

## (undated) DEVICE — GAUZE SPONGES,16 PLY: Brand: CURITY

## (undated) DEVICE — 2.0MM DRILL BIT/QC/100MM

## (undated) DEVICE — OCCLUSIVE GAUZE STRIP,3% BISMUTH TRIBROMOPHENATE IN PETROLATUM BLEND: Brand: XEROFORM

## (undated) DEVICE — GLOVE INDICATOR PI UNDERGLOVE SZ 9 BLUE

## (undated) DEVICE — 3M™ COBAN™ NL STERILE NON-LATEX SELF-ADHERENT WRAP, 2084S, 4 IN X 5 YD (10 CM X 4,5 M), 18 ROLLS/CASE: Brand: 3M™ COBAN™

## (undated) DEVICE — PAD GROUNDING ADULT

## (undated) DEVICE — SUT VICRYL 3-0 SH 27 IN J416H

## (undated) DEVICE — 2.4MM CORTEX SCREW SLF-TPNG WITH T8 STARDRIVE RECESS 20MM
Type: IMPLANTABLE DEVICE | Site: WRIST | Status: NON-FUNCTIONAL
Removed: 2023-02-01

## (undated) DEVICE — STERILE BETHLEHEM PLASTIC HAND: Brand: CARDINAL HEALTH

## (undated) DEVICE — DISPOSABLE EQUIPMENT COVER: Brand: SMALL TOWEL DRAPE